# Patient Record
Sex: FEMALE | Race: WHITE | NOT HISPANIC OR LATINO | Employment: OTHER | URBAN - METROPOLITAN AREA
[De-identification: names, ages, dates, MRNs, and addresses within clinical notes are randomized per-mention and may not be internally consistent; named-entity substitution may affect disease eponyms.]

---

## 2017-05-12 ENCOUNTER — RECORDS - HEALTHEAST (OUTPATIENT)
Dept: LAB | Facility: CLINIC | Age: 57
End: 2017-05-12

## 2017-05-12 LAB
CHOLEST SERPL-MCNC: 185 MG/DL
CHOLEST SERPL-MCNC: NORMAL MG/DL
FASTING STATUS PATIENT QL REPORTED: YES
FASTING STATUS PATIENT QL REPORTED: YES
HDLC SERPL-MCNC: 47 MG/DL
HDLC SERPL-MCNC: NORMAL MG/DL
LDLC SERPL CALC-MCNC: 127 MG/DL
LDLC SERPL CALC-MCNC: NORMAL MG/DL
TRIGL SERPL-MCNC: 55 MG/DL
TRIGL SERPL-MCNC: NORMAL MG/DL

## 2017-05-19 ENCOUNTER — RECORDS - HEALTHEAST (OUTPATIENT)
Dept: ADMINISTRATIVE | Facility: OTHER | Age: 57
End: 2017-05-19

## 2018-06-26 ENCOUNTER — RECORDS - HEALTHEAST (OUTPATIENT)
Dept: LAB | Facility: CLINIC | Age: 58
End: 2018-06-26

## 2018-06-26 LAB
ALBUMIN SERPL-MCNC: 4.1 G/DL (ref 3.5–5)
ALP SERPL-CCNC: 71 U/L (ref 45–120)
ALT SERPL W P-5'-P-CCNC: 19 U/L (ref 0–45)
ANION GAP SERPL CALCULATED.3IONS-SCNC: 12 MMOL/L (ref 5–18)
AST SERPL W P-5'-P-CCNC: 22 U/L (ref 0–40)
BILIRUB SERPL-MCNC: 0.5 MG/DL (ref 0–1)
BUN SERPL-MCNC: 10 MG/DL (ref 8–22)
CALCIUM SERPL-MCNC: 9.8 MG/DL (ref 8.5–10.5)
CHLORIDE BLD-SCNC: 104 MMOL/L (ref 98–107)
CO2 SERPL-SCNC: 25 MMOL/L (ref 22–31)
CREAT SERPL-MCNC: 0.78 MG/DL (ref 0.6–1.1)
GFR SERPL CREATININE-BSD FRML MDRD: >60 ML/MIN/1.73M2
GLUCOSE BLD-MCNC: 91 MG/DL (ref 70–125)
POTASSIUM BLD-SCNC: 4.1 MMOL/L (ref 3.5–5)
PROT SERPL-MCNC: 6.8 G/DL (ref 6–8)
RHEUMATOID FACT SERPL-ACNC: <15 IU/ML (ref 0–30)
SODIUM SERPL-SCNC: 141 MMOL/L (ref 136–145)
VIT B12 SERPL-MCNC: 350 PG/ML (ref 213–816)

## 2019-02-28 LAB
HPV SOURCE: NORMAL
HUMAN PAPILLOMA VIRUS 16 DNA: NEGATIVE
HUMAN PAPILLOMA VIRUS 18 DNA: NEGATIVE
HUMAN PAPILLOMA VIRUS FINAL DIAGNOSIS: NORMAL
HUMAN PAPILLOMA VIRUS OTHER HR: NEGATIVE
SPECIMEN DESCRIPTION: NORMAL

## 2019-03-05 ENCOUNTER — RECORDS - HEALTHEAST (OUTPATIENT)
Dept: ADMINISTRATIVE | Facility: OTHER | Age: 59
End: 2019-03-05

## 2020-06-29 ENCOUNTER — RECORDS - HEALTHEAST (OUTPATIENT)
Dept: LAB | Facility: CLINIC | Age: 60
End: 2020-06-29

## 2020-06-30 LAB — COVID-19 ANTIBODY IGG: NEGATIVE

## 2021-03-04 ENCOUNTER — RECORDS - HEALTHEAST (OUTPATIENT)
Dept: LAB | Facility: CLINIC | Age: 61
End: 2021-03-04

## 2021-03-04 LAB
ALBUMIN SERPL-MCNC: 4.1 G/DL (ref 3.5–5)
ALP SERPL-CCNC: 76 U/L (ref 45–120)
ALT SERPL W P-5'-P-CCNC: 18 U/L (ref 0–45)
ANION GAP SERPL CALCULATED.3IONS-SCNC: 10 MMOL/L (ref 5–18)
AST SERPL W P-5'-P-CCNC: 21 U/L (ref 0–40)
BILIRUB SERPL-MCNC: 0.2 MG/DL (ref 0–1)
BUN SERPL-MCNC: 14 MG/DL (ref 8–22)
CALCIUM SERPL-MCNC: 9.2 MG/DL (ref 8.5–10.5)
CHLORIDE BLD-SCNC: 108 MMOL/L (ref 98–107)
CO2 SERPL-SCNC: 24 MMOL/L (ref 22–31)
CREAT SERPL-MCNC: 0.71 MG/DL (ref 0.6–1.1)
ERYTHROCYTE [SEDIMENTATION RATE] IN BLOOD BY WESTERGREN METHOD: 8 MM/HR (ref 0–20)
GFR SERPL CREATININE-BSD FRML MDRD: >60 ML/MIN/1.73M2
GLUCOSE BLD-MCNC: 100 MG/DL (ref 70–125)
POTASSIUM BLD-SCNC: 4.4 MMOL/L (ref 3.5–5)
PROT SERPL-MCNC: 6.7 G/DL (ref 6–8)
SODIUM SERPL-SCNC: 142 MMOL/L (ref 136–145)

## 2021-03-05 ENCOUNTER — RECORDS - HEALTHEAST (OUTPATIENT)
Dept: LAB | Facility: CLINIC | Age: 61
End: 2021-03-05

## 2021-03-05 LAB — COVID-19 ANTIBODY IGG: NEGATIVE

## 2021-03-06 LAB
FERRITIN SERPL-MCNC: 77 NG/ML (ref 10–130)
IRON SATN MFR SERPL: 26 % (ref 20–50)
IRON SERPL-MCNC: 79 UG/DL (ref 42–175)
TIBC SERPL-MCNC: 309 UG/DL (ref 313–563)
TRANSFERRIN SERPL-MCNC: 247 MG/DL (ref 212–360)

## 2022-07-06 ENCOUNTER — LAB REQUISITION (OUTPATIENT)
Dept: LAB | Facility: CLINIC | Age: 62
End: 2022-07-06
Payer: COMMERCIAL

## 2022-07-06 DIAGNOSIS — Z13.220 ENCOUNTER FOR SCREENING FOR LIPOID DISORDERS: ICD-10-CM

## 2022-07-06 LAB
CHOLEST SERPL-MCNC: 222 MG/DL
HDLC SERPL-MCNC: 64 MG/DL
LDLC SERPL CALC-MCNC: 140 MG/DL
NONHDLC SERPL-MCNC: 158 MG/DL
TRIGL SERPL-MCNC: 89 MG/DL

## 2022-07-06 PROCEDURE — 80061 LIPID PANEL: CPT | Mod: ORL | Performed by: FAMILY MEDICINE

## 2023-03-07 ENCOUNTER — LAB REQUISITION (OUTPATIENT)
Dept: LAB | Facility: CLINIC | Age: 63
End: 2023-03-07
Payer: COMMERCIAL

## 2023-03-07 DIAGNOSIS — Z76.89 PERSONS ENCOUNTERING HEALTH SERVICES IN OTHER SPECIFIED CIRCUMSTANCES: ICD-10-CM

## 2023-03-07 LAB
ALBUMIN SERPL BCG-MCNC: 4.3 G/DL (ref 3.5–5.2)
ALP SERPL-CCNC: 100 U/L (ref 35–104)
ALT SERPL W P-5'-P-CCNC: 117 U/L (ref 10–35)
ANION GAP SERPL CALCULATED.3IONS-SCNC: 13 MMOL/L (ref 7–15)
AST SERPL W P-5'-P-CCNC: 131 U/L (ref 10–35)
BILIRUB SERPL-MCNC: 0.2 MG/DL
BUN SERPL-MCNC: 11.8 MG/DL (ref 8–23)
CALCIUM SERPL-MCNC: 9.5 MG/DL (ref 8.8–10.2)
CHLORIDE SERPL-SCNC: 101 MMOL/L (ref 98–107)
CREAT SERPL-MCNC: 0.79 MG/DL (ref 0.51–0.95)
DEPRECATED HCO3 PLAS-SCNC: 24 MMOL/L (ref 22–29)
GFR SERPL CREATININE-BSD FRML MDRD: 83 ML/MIN/1.73M2
GLUCOSE SERPL-MCNC: 172 MG/DL (ref 70–99)
POTASSIUM SERPL-SCNC: 4.7 MMOL/L (ref 3.4–5.3)
PROT SERPL-MCNC: 6.6 G/DL (ref 6.4–8.3)
SODIUM SERPL-SCNC: 138 MMOL/L (ref 136–145)

## 2023-03-07 PROCEDURE — 80053 COMPREHEN METABOLIC PANEL: CPT | Mod: ORL | Performed by: FAMILY MEDICINE

## 2023-05-10 ENCOUNTER — LAB REQUISITION (OUTPATIENT)
Dept: LAB | Facility: CLINIC | Age: 63
End: 2023-05-10
Payer: COMMERCIAL

## 2023-05-10 DIAGNOSIS — Z13.220 ENCOUNTER FOR SCREENING FOR LIPOID DISORDERS: ICD-10-CM

## 2023-05-10 DIAGNOSIS — Z86.16 PERSONAL HISTORY OF COVID-19: ICD-10-CM

## 2023-05-10 DIAGNOSIS — Z00.00 ENCOUNTER FOR GENERAL ADULT MEDICAL EXAMINATION WITHOUT ABNORMAL FINDINGS: ICD-10-CM

## 2023-05-10 LAB
ALBUMIN SERPL BCG-MCNC: 4.5 G/DL (ref 3.5–5.2)
ALP SERPL-CCNC: 68 U/L (ref 35–104)
ALT SERPL W P-5'-P-CCNC: 32 U/L (ref 10–35)
ANION GAP SERPL CALCULATED.3IONS-SCNC: 14 MMOL/L (ref 7–15)
AST SERPL W P-5'-P-CCNC: 28 U/L (ref 10–35)
BILIRUB SERPL-MCNC: 0.4 MG/DL
BUN SERPL-MCNC: 11.1 MG/DL (ref 8–23)
CALCIUM SERPL-MCNC: 9.6 MG/DL (ref 8.8–10.2)
CHLORIDE SERPL-SCNC: 102 MMOL/L (ref 98–107)
CHOLEST SERPL-MCNC: 233 MG/DL
CREAT SERPL-MCNC: 0.76 MG/DL (ref 0.51–0.95)
DEPRECATED HCO3 PLAS-SCNC: 25 MMOL/L (ref 22–29)
GFR SERPL CREATININE-BSD FRML MDRD: 87 ML/MIN/1.73M2
GLUCOSE SERPL-MCNC: 86 MG/DL (ref 70–99)
HDLC SERPL-MCNC: 65 MG/DL
LDLC SERPL CALC-MCNC: 149 MG/DL
NONHDLC SERPL-MCNC: 168 MG/DL
POTASSIUM SERPL-SCNC: 4.7 MMOL/L (ref 3.4–5.3)
PROT SERPL-MCNC: 6.9 G/DL (ref 6.4–8.3)
SODIUM SERPL-SCNC: 141 MMOL/L (ref 136–145)
TRIGL SERPL-MCNC: 95 MG/DL

## 2023-05-10 PROCEDURE — 87624 HPV HI-RISK TYP POOLED RSLT: CPT | Mod: ORL | Performed by: FAMILY MEDICINE

## 2023-05-10 PROCEDURE — 80061 LIPID PANEL: CPT | Mod: ORL | Performed by: FAMILY MEDICINE

## 2023-05-10 PROCEDURE — 80053 COMPREHEN METABOLIC PANEL: CPT | Mod: ORL | Performed by: FAMILY MEDICINE

## 2023-05-10 PROCEDURE — G0145 SCR C/V CYTO,THINLAYER,RESCR: HCPCS | Mod: ORL | Performed by: FAMILY MEDICINE

## 2023-05-12 LAB
BKR LAB AP GYN ADEQUACY: NORMAL
BKR LAB AP GYN INTERPRETATION: NORMAL
BKR LAB AP HPV REFLEX: NORMAL
BKR LAB AP PREVIOUS ABNORMAL: NORMAL
PATH REPORT.COMMENTS IMP SPEC: NORMAL
PATH REPORT.COMMENTS IMP SPEC: NORMAL
PATH REPORT.RELEVANT HX SPEC: NORMAL

## 2023-05-15 LAB
HUMAN PAPILLOMA VIRUS 16 DNA: NEGATIVE
HUMAN PAPILLOMA VIRUS 18 DNA: NEGATIVE
HUMAN PAPILLOMA VIRUS FINAL DIAGNOSIS: NORMAL
HUMAN PAPILLOMA VIRUS OTHER HR: NEGATIVE

## 2024-03-12 ENCOUNTER — OFFICE VISIT (OUTPATIENT)
Dept: URGENT CARE | Facility: URGENT CARE | Age: 64
End: 2024-03-12
Payer: COMMERCIAL

## 2024-03-12 VITALS
HEART RATE: 86 BPM | RESPIRATION RATE: 18 BRPM | SYSTOLIC BLOOD PRESSURE: 135 MMHG | WEIGHT: 122.7 LBS | DIASTOLIC BLOOD PRESSURE: 87 MMHG | TEMPERATURE: 99.8 F | OXYGEN SATURATION: 98 %

## 2024-03-12 DIAGNOSIS — J01.00 ACUTE NON-RECURRENT MAXILLARY SINUSITIS: Primary | ICD-10-CM

## 2024-03-12 LAB
FLUAV AG SPEC QL IA: NEGATIVE
FLUBV AG SPEC QL IA: NEGATIVE

## 2024-03-12 PROCEDURE — 87804 INFLUENZA ASSAY W/OPTIC: CPT | Performed by: PHYSICIAN ASSISTANT

## 2024-03-12 PROCEDURE — 99203 OFFICE O/P NEW LOW 30 MIN: CPT | Performed by: PHYSICIAN ASSISTANT

## 2024-03-12 RX ORDER — ERGOCALCIFEROL 1.25 MG/1
5000 CAPSULE ORAL DAILY
COMMUNITY

## 2024-03-13 NOTE — PROGRESS NOTES
URGENT CARE VISIT:    SUBJECTIVE:   Verónica Horner is a 64 year old female presenting with a chief complaint of fever, stuffy nose, cough - productive, facial pain/pressure, and headache.  Onset was 1 week(s) ago.   She denies the following symptoms: shortness of breath  Course of illness is same.  Headache resolved once she got up and moving in the am.  Treatment measures tried include Tylenol/Ibuprofen with some relief of symptoms.  Predisposing factors include ill contact: Family member .    PMH: History reviewed. No pertinent past medical history.  Allergies: Clindamycin   Medications:   Current Outpatient Medications   Medication Sig Dispense Refill    amoxicillin-clavulanate (AUGMENTIN) 875-125 MG tablet Take 1 tablet by mouth 2 times daily for 7 days 14 tablet 0    ergocalciferol (ERGOCALCIFEROL) 1.25 MG (56026 UT) capsule Take 5,000 Units by mouth daily       Social History:   Social History     Tobacco Use    Smoking status: Not on file    Smokeless tobacco: Not on file   Substance Use Topics    Alcohol use: Not on file       ROS:  Review of systems negative except as stated above.    OBJECTIVE:  /87 (BP Location: Right arm, Patient Position: Sitting, Cuff Size: Adult Regular)   Pulse 86   Temp 99.8  F (37.7  C) (Tympanic)   Resp 18   Wt 55.7 kg (122 lb 11.2 oz)   SpO2 98%   GENERAL APPEARANCE: healthy, alert and no distress  EYES: EOMI,  PERRL, conjunctiva clear  HENT: ear canals and TM's normal.  Nose and mouth without ulcers, erythema or lesions  NECK: supple, nontender, no lymphadenopathy  RESP: lungs clear to auscultation - no rales, rhonchi or wheezes  CV: regular rates and rhythm, normal S1 S2, no murmur noted  SKIN: no suspicious lesions or rashes    Labs:    Results for orders placed or performed in visit on 03/12/24   Influenza A & B Antigen - Clinic Collect     Status: Normal    Specimen: Nose; Swab   Result Value Ref Range    Influenza A antigen Negative Negative    Influenza B  antigen Negative Negative    Narrative    Test results must be correlated with clinical data. If necessary, results should be confirmed by a molecular assay or viral culture.       ASSESSMENT:    ICD-10-CM    1. Acute non-recurrent maxillary sinusitis  J01.00 Influenza A & B Antigen - Clinic Collect     amoxicillin-clavulanate (AUGMENTIN) 875-125 MG tablet          PLAN:  Patient Instructions   Patient was educated on the natural course of condition.  Take medications as prescribed if symptoms do not improve with the discussed measures in 3 days. Side effects may include stomachache or diarrhea. Conservative measures discussed including increased fluids, nasal saline irrigation (neti pot), Afrin nasal spray for 3 days, warm steamy shower, expectorants (Mucinex), and analgesics (Tylenol and/or Ibuprofen). See your primary care provider if symptoms worsen or do not improve in 7 days. Seek emergency care if you develop fever over 103 or shortness of breath.    Patient verbalized understanding and is agreeable to plan. The patient was discharged ambulatory and in stable condition.    Rosalia Jacobs PA-C ....................  3/12/2024   7:21 PM

## 2024-03-13 NOTE — PATIENT INSTRUCTIONS
Patient was educated on the natural course of condition.  Take medications as prescribed. Side effects may include stomachache or diarrhea. Conservative measures discussed including increased fluids, nasal saline irrigation (neti pot), Afrin nasal spray for 3 days, warm steamy shower, expectorants (Mucinex), and analgesics (Tylenol and/or Ibuprofen). See your primary care provider if symptoms worsen or do not improve in 7 days. Seek emergency care if you develop fever over 103 or shortness of breath.

## 2024-05-12 ENCOUNTER — HEALTH MAINTENANCE LETTER (OUTPATIENT)
Age: 64
End: 2024-05-12

## 2024-05-20 ENCOUNTER — LAB REQUISITION (OUTPATIENT)
Dept: LAB | Facility: CLINIC | Age: 64
End: 2024-05-20
Payer: COMMERCIAL

## 2024-05-20 DIAGNOSIS — Z13.220 ENCOUNTER FOR SCREENING FOR LIPOID DISORDERS: ICD-10-CM

## 2024-05-20 LAB
CHOLEST SERPL-MCNC: 209 MG/DL
FASTING STATUS PATIENT QL REPORTED: ABNORMAL
HDLC SERPL-MCNC: 56 MG/DL
LDLC SERPL CALC-MCNC: 138 MG/DL
NONHDLC SERPL-MCNC: 153 MG/DL
TRIGL SERPL-MCNC: 77 MG/DL

## 2024-05-20 PROCEDURE — 80061 LIPID PANEL: CPT | Mod: ORL | Performed by: FAMILY MEDICINE

## 2024-12-12 ENCOUNTER — TRANSCRIBE ORDERS (OUTPATIENT)
Dept: OTHER | Age: 64
End: 2024-12-12

## 2024-12-12 DIAGNOSIS — M54.2 CERVICALGIA: Primary | ICD-10-CM

## 2024-12-27 PROBLEM — M54.2 NECK PAIN: Status: ACTIVE | Noted: 2024-12-27

## 2024-12-27 PROBLEM — M25.512 CHRONIC LEFT SHOULDER PAIN: Status: ACTIVE | Noted: 2024-12-27

## 2024-12-27 PROBLEM — G89.29 CHRONIC LEFT SHOULDER PAIN: Status: ACTIVE | Noted: 2024-12-27

## 2025-02-04 ENCOUNTER — THERAPY VISIT (OUTPATIENT)
Dept: PHYSICAL THERAPY | Facility: CLINIC | Age: 65
End: 2025-02-04
Payer: COMMERCIAL

## 2025-02-04 DIAGNOSIS — M25.512 CHRONIC LEFT SHOULDER PAIN: Primary | ICD-10-CM

## 2025-02-04 DIAGNOSIS — G89.29 CHRONIC LEFT SHOULDER PAIN: Primary | ICD-10-CM

## 2025-02-04 DIAGNOSIS — M54.2 NECK PAIN: ICD-10-CM

## 2025-02-04 PROCEDURE — 97110 THERAPEUTIC EXERCISES: CPT | Mod: GP

## 2025-02-04 PROCEDURE — 97530 THERAPEUTIC ACTIVITIES: CPT | Mod: GP

## 2025-04-01 ENCOUNTER — THERAPY VISIT (OUTPATIENT)
Dept: PHYSICAL THERAPY | Facility: CLINIC | Age: 65
End: 2025-04-01
Payer: COMMERCIAL

## 2025-04-01 DIAGNOSIS — M25.512 CHRONIC LEFT SHOULDER PAIN: Primary | ICD-10-CM

## 2025-04-01 DIAGNOSIS — G89.29 CHRONIC LEFT SHOULDER PAIN: Primary | ICD-10-CM

## 2025-04-01 DIAGNOSIS — M54.2 NECK PAIN: ICD-10-CM

## 2025-04-01 PROCEDURE — 97110 THERAPEUTIC EXERCISES: CPT | Mod: GP

## 2025-04-01 PROCEDURE — 97164 PT RE-EVAL EST PLAN CARE: CPT | Mod: GP

## 2025-04-01 NOTE — PROGRESS NOTES
[facility]                                                                                   OUTPATIENT PHYSICAL THERAPY    PLAN OF TREATMENT FOR OUTPATIENT REHABILITATION   Patient's Last Name, First Name, Verónica Caraballo YOB: 1960   Provider's Name   [facility]   Medical Record No.  [id number]     Onset Date: 12/12/24 (MD order date)  Start of Care Date: 12/27/24     Medical Diagnosis:  Cervicalgia      PT Treatment Diagnosis:  Neck pain, L shoulder pain Plan of Treatment  Frequency/Duration: every-other wk/ 3 mo    Certification date from 03/27/25 to 06/26/25         See note for plan of treatment details and functional goals     NIYAH HERNANDEZ, PT                         I CERTIFY THE NEED FOR THESE SERVICES FURNISHED UNDER        THIS PLAN OF TREATMENT AND WHILE UNDER MY CARE     (Physician attestation of this document indicates review and certification of the therapy plan).              Referring Provider:  Piedad Kelley    Initial Assessment  See Epic Evaluation- Start of Care Date: 12/27/24        PLAN  Continue therapy per current plan of care.    Beginning/End Dates of Progress Note Reporting Period:  12/27/24 to 04/01/2025    Referring Provider:  Piedad Kelley     04/01/25 0500   Appointment Info   Signing clinician's name / credentials Niyah Hernandez, PT, DPT, WCS   Total/Authorized Visits E&T   Visits Used 4   Medical Diagnosis Cervicalgia   PT Tx Diagnosis Neck pain, L shoulder pain   Quick Adds Certification   Progress Note/Certification   Start of Care Date 12/27/24   Onset of illness/injury or Date of Surgery 12/12/24  (MD order date)   Therapy Frequency every-other wk   Predicted Duration 3 mo   Certification date from 03/27/25   Certification date to 06/26/25   Progress Note Due Date 06/26/25   Progress Note Completed Date 12/27/24   [facility] Authorization Information   Condition type Chronic (continuous duration  <3 months)   Cause of current episode Repetitive   Nature of treatment Rehabilitative   Functional ability Minimal functional limitations   Documented POC (choose all that apply) Frequency of treatment visits and treatment activities to address deficit areas.;Patient agrees to program participation including home program;Measurable short and long term/discharge treatment goals related to physical and functional deficits.   Briefly describe symptoms L sided neck and shoulder pain   How did the symptoms start gradual onset w/ no IRMA   Last 24H: avg pain/symptom intensity  3/10   Past wk: avg pain/symptom intensity 4/10   Frequency of Symptoms Constantly (% of the time)   Symptom impact on ADLs Moderately   Condition change since eval Better   General health reported by patient Excellent   GOALS   PT Goals 2   PT Goal 1   Goal Identifier Driving   Goal Description Pt will report driving up to 2 hours w/out neck or shoulder pain   Rationale to maximize safety and independence with performance of ADLs and functional tasks;to maximize safety and independence within the community;to maximize safety and independence with transportation;to maximize safety and independence with self cares   Goal Progress no longer any issue   Target Date 03/26/25   Date Met 04/01/25   PT Goal 2   Goal Identifier Lifting   Goal Description Pt will lift 30 lbs from floor to shoulder height w/ no pain for painfree care of grandchildren and participation in hobbies   Rationale to maximize safety and independence with performance of ADLs and functional tasks;to maximize safety and independence within the home;to maximize safety and independence within the community;to maximize safety and independence with self cares   Goal Progress Pain lifting 15+ lbs currently   Target Date 06/26/25   Subjective Report   Subjective Report Returns after a 2 month break in care. Rain reports has been trying out other things (new pillow, etc) but continues  "to have L neck pain. Reports good compliance to HEP, stretches make her feel more sore. Lifting her grandkids (30 lbs) causes L neck pain. Nervous about upcoming gardening season and don't want to make it worse.   Objective Measures   Objective Measures Objective Measure 1;Objective Measure 2;Objective Measure 3   Objective Measure 1   Objective Measure Pain   Details Located in L anterolateral neck and upper trap. Scalenes are tight and TTP on L   Objective Measure 2   Objective Measure Neck AROM WNL   Details Flexion WNL. Rotation WNL but feels tight BRIJESH. Extension min loss and painful. SB mod loss R, min loss L tight.   Objective Measure 3   Objective Measure Shoulder testing   Details Neer and HK negative BRIJESH . Strength 5/5 painfree BRIJESH. Slightly limited UR on L w/ shoulder elevation. Middle and low trap 4/5 BRIJESH painfree   Treatment Interventions (PT)   Interventions Therapeutic Procedure/Exercise;Therapeutic Activity   Therapeutic Procedure/Exercise   Therapeutic Procedures: strength, endurance, ROM, flexibility minutes (38484) 24   Ther Proc 1 Time to answer pt questions about what ex is safe/what to avoid to prevent reinjury/worsening pain. Educated in avoiding ex that cause cervical flexion for now (crunches and situps) and emphasis on keeping C spine neutral during workouts (don't look down).   PTRx Ther Proc 1 Spinal extensions   PTRx Ther Proc 1 - Details Verbal review   PTRx Ther Proc 2 Trunk Rotation Stretch   PTRx Ther Proc 2 - Details x5 each way w/ cues for positioning during   PTRx Ther Proc 3 All 4s Cat Cow   PTRx Ther Proc 3 - Details Verbal review   PTRx Ther Proc 4 Cervical Isometric Rotation   PTRx Ther Proc 4 - Details 5\"x5 L and R   PTRx Ther Proc 5 Cervical Isometric Side Bending   PTRx Ther Proc 5 - Details 5\"x5 L and R   PTRx Ther Proc 6 Cervical Isometric Flexion   PTRx Ther Proc 6 - Details 5\"x5   PTRx Ther Proc 7 Cervical Isometric Extension   PTRx Ther Proc 7 - Details 5\"x5   PTRx Ther " Proc 8 Serratus Slides on Wall   PTRx Ther Proc 8 - Details Verbal review   PTRx Ther Proc 9 Shoulder External Rotation Sidelying   PTRx Ther Proc 9 - Details Reviewed with 3 lbs x15 bilaterally. Progressed today as this ex felt easy   PTRx Ther Proc 10 Shoulder Theraband External Rotation   PTRx Ther Proc 10 - Details Green TB x15 w/ cues for positioning and setup during   PTRx Ther Proc 11 Shoulder Theraband Internal Rotation   PTRx Ther Proc 11 - Details Green TB x15 w/ cues for positioning and setup during   PTRx Ther Proc 12 Bent Over Rows   PTRx Ther Proc 12 - Details Verbal review   PTRx Ther Proc 13 Cervical Retraction   PTRx Ther Proc 13 - Details Rev x10 seated   PTRx Ther Proc 14 Cervical Extension Supported   PTRx Ther Proc 14 - Details Rev x5 seated   Patient Response/Progress no adverse effects, dec pain post   Therapeutic Activity   Therapeutic Activities Ther Act 2   PTRx Ther Act 1 Foam Roller Shoulder Flexion/Horizontal Abduction   PTRx Ther Act 1 - Details Verbal review   PTRx Ther Act 2 Body Mechanics - Full Squat   PTRx Ther Act 2 - Details Verbal review on body mechanics w/ lifting her grandkids and when gardening   Manual Therapy   Manual Therapy Manual Therapy 2   Eval/Assessments   Assessments PT Re-Eval   PT Eval, Re-eval Minutes (17660) 16   Education   Learner/Method Patient;Listening;Demonstration;Pictures/Video;No Barriers to Learning   Education Comments pt will schedule one appt with Mary Ellen García for further assessment and potential TMJ involvement contributing to her neck sx.   Plan   Home program online access   Updates to plan of care reeval   Plan for next session See Mary Ellen for TMJ/neck assessment   Total Session Time   Timed Code Treatment Minutes 24   Total Treatment Time (sum of timed and untimed services) 40

## 2025-04-28 ENCOUNTER — THERAPY VISIT (OUTPATIENT)
Dept: PHYSICAL THERAPY | Facility: CLINIC | Age: 65
End: 2025-04-28
Payer: COMMERCIAL

## 2025-04-28 DIAGNOSIS — G89.29 CHRONIC LEFT SHOULDER PAIN: Primary | ICD-10-CM

## 2025-04-28 DIAGNOSIS — M25.512 CHRONIC LEFT SHOULDER PAIN: Primary | ICD-10-CM

## 2025-04-28 DIAGNOSIS — M54.2 NECK PAIN: ICD-10-CM

## 2025-04-28 PROCEDURE — 97112 NEUROMUSCULAR REEDUCATION: CPT | Mod: GP | Performed by: PHYSICAL THERAPIST

## 2025-04-28 PROCEDURE — 97110 THERAPEUTIC EXERCISES: CPT | Mod: GP | Performed by: PHYSICAL THERAPIST

## 2025-04-28 PROCEDURE — 97140 MANUAL THERAPY 1/> REGIONS: CPT | Mod: GP | Performed by: PHYSICAL THERAPIST

## 2025-05-15 ENCOUNTER — LAB REQUISITION (OUTPATIENT)
Dept: LAB | Facility: CLINIC | Age: 65
End: 2025-05-15
Payer: COMMERCIAL

## 2025-05-15 DIAGNOSIS — M85.89 OTHER SPECIFIED DISORDERS OF BONE DENSITY AND STRUCTURE, MULTIPLE SITES: ICD-10-CM

## 2025-05-15 DIAGNOSIS — Z00.00 ENCOUNTER FOR GENERAL ADULT MEDICAL EXAMINATION WITHOUT ABNORMAL FINDINGS: ICD-10-CM

## 2025-05-15 LAB
CHOLEST SERPL-MCNC: 191 MG/DL
FASTING STATUS PATIENT QL REPORTED: YES
HDLC SERPL-MCNC: 50 MG/DL
LDLC SERPL CALC-MCNC: 128 MG/DL
NONHDLC SERPL-MCNC: 141 MG/DL
TRIGL SERPL-MCNC: 65 MG/DL
VIT D+METAB SERPL-MCNC: 31 NG/ML (ref 20–50)

## 2025-05-15 PROCEDURE — 80061 LIPID PANEL: CPT | Mod: ORL | Performed by: FAMILY MEDICINE

## 2025-05-15 PROCEDURE — 82306 VITAMIN D 25 HYDROXY: CPT | Mod: ORL | Performed by: FAMILY MEDICINE

## 2025-05-18 ENCOUNTER — HEALTH MAINTENANCE LETTER (OUTPATIENT)
Age: 65
End: 2025-05-18

## 2025-05-22 ENCOUNTER — THERAPY VISIT (OUTPATIENT)
Dept: PHYSICAL THERAPY | Facility: CLINIC | Age: 65
End: 2025-05-22
Payer: COMMERCIAL

## 2025-05-22 DIAGNOSIS — M25.512 CHRONIC LEFT SHOULDER PAIN: Primary | ICD-10-CM

## 2025-05-22 DIAGNOSIS — G89.29 CHRONIC LEFT SHOULDER PAIN: Primary | ICD-10-CM

## 2025-05-22 DIAGNOSIS — M54.2 NECK PAIN: ICD-10-CM

## 2025-06-10 ENCOUNTER — THERAPY VISIT (OUTPATIENT)
Dept: PHYSICAL THERAPY | Facility: CLINIC | Age: 65
End: 2025-06-10
Payer: COMMERCIAL

## 2025-06-10 DIAGNOSIS — M54.2 NECK PAIN: ICD-10-CM

## 2025-06-10 DIAGNOSIS — M25.512 CHRONIC LEFT SHOULDER PAIN: Primary | ICD-10-CM

## 2025-06-10 DIAGNOSIS — G89.29 CHRONIC LEFT SHOULDER PAIN: Primary | ICD-10-CM

## 2025-06-10 PROCEDURE — 97140 MANUAL THERAPY 1/> REGIONS: CPT | Mod: GP | Performed by: PHYSICAL THERAPIST

## 2025-06-10 PROCEDURE — 97110 THERAPEUTIC EXERCISES: CPT | Mod: GP | Performed by: PHYSICAL THERAPIST

## 2025-07-03 ENCOUNTER — THERAPY VISIT (OUTPATIENT)
Dept: PHYSICAL THERAPY | Facility: CLINIC | Age: 65
End: 2025-07-03
Payer: COMMERCIAL

## 2025-07-03 DIAGNOSIS — M25.512 CHRONIC LEFT SHOULDER PAIN: Primary | ICD-10-CM

## 2025-07-03 DIAGNOSIS — G89.29 CHRONIC LEFT SHOULDER PAIN: Primary | ICD-10-CM

## 2025-07-03 DIAGNOSIS — M54.2 NECK PAIN: ICD-10-CM

## 2025-07-03 NOTE — PROGRESS NOTES
[facility]                                                                                   OUTPATIENT PHYSICAL THERAPY    PLAN OF TREATMENT FOR OUTPATIENT REHABILITATION   Patient's Last Name, First Name, Verónica Caraballo YOB: 1960   Provider's Name   [facility]   Medical Record No.  [id number]     Onset Date: 12/12/24 (MD order date)  Start of Care Date: 12/27/24     Medical Diagnosis:  Cervicalgia      PT Treatment Diagnosis:  Neck pain, L shoulder pain Plan of Treatment  Frequency/Duration: every other week/ 3 visits    Certification date from 06/27/25 to 08/07/25         See note for plan of treatment details and functional goals     Mary Ellen García PT                         I CERTIFY THE NEED FOR THESE SERVICES FURNISHED UNDER        THIS PLAN OF TREATMENT AND WHILE UNDER MY CARE .             Physician Signature               Date    X_____________________________________________________                  Referring Provider:  Piedad Kelley    Initial Assessment  See Epic Evaluation- Start of Care Date: 12/27/24            PLAN  Continue therapy per current plan of care.    Beginning/End Dates of Progress Note Reporting Period:  04/01/25 to 07/03/2025    Referring Provider:  Piedad Kelley     07/03/25 0500   Appointment Info   Signing clinician's name / credentials Mary Ellen García DPT   Total/Authorized Visits E&T   Visits Used 8   Medical Diagnosis Cervicalgia   PT Tx Diagnosis Neck pain, L shoulder pain   Progress Note/Certification   Start of Care Date 12/27/24   Onset of illness/injury or Date of Surgery 12/12/24  (MD order date)   Therapy Frequency every other week   Predicted Duration 3 visits   Certification date from 06/27/25   Certification date to 08/07/25   Progress Note Due Date 08/07/25   Progress Note Completed Date 04/01/25   [facility] Authorization Information   Condition type Chronic (continuous  duration >3 months)   Cause of current episode Repetitive   Nature of treatment Rehabilitative   Functional ability Minimal functional limitations   Documented POC (choose all that apply) Frequency of treatment visits and treatment activities to address deficit areas.;Patient agrees to program participation including home program;Measurable short and long term/discharge treatment goals related to physical and functional deficits.   Briefly describe symptoms L sided neck and shoulder pain   How did the symptoms start gradual onset w/ no IRMA   Last 24H: avg pain/symptom intensity  2/10   Past wk: avg pain/symptom intensity 2/10   Frequency of Symptoms Occasionally (26-50% of the time)   Symptom impact on ADLs Moderately   Condition change since eval Much better   General health reported by patient Excellent   GOALS   PT Goals 2   PT Goal 1   Goal Identifier Driving   Goal Description Pt will report driving up to 2 hours w/out neck or shoulder pain   Rationale to maximize safety and independence with performance of ADLs and functional tasks;to maximize safety and independence within the community;to maximize safety and independence with transportation;to maximize safety and independence with self cares   Goal Progress no longer any issue   Target Date 03/26/25   Date Met 04/01/25   PT Goal 2   Goal Identifier Lifting   Goal Description Pt will lift 30 lbs from floor to shoulder height w/ no pain for painfree care of grandchildren and participation in hobbies   Rationale to maximize safety and independence with performance of ADLs and functional tasks;to maximize safety and independence within the home;to maximize safety and independence within the community;to maximize safety and independence with self cares   Goal Progress 15# is getting easier, continues   Target Date 08/07/25   Subjective Report   Subjective Report Rain reports she has been consistent with her HEP - especially the strengthening exercises.  She has  been focusing more on strength training at the gym and gardening.  She does still get some pain when gardening and overhead lifting with small dumbbells.  Neck is mildly sore today rated 2-3/10 on the upper left side.   Objective Measures   Objective Measures Objective Measure 1;Objective Measure 2;Objective Measure 3   Objective Measure 2   Objective Measure Neck AROM WNL   Details Flexion WNL. Rotation R 55-68, L 54-64 but feels tight BRIJESH suboccipitals.   Extension min loss and painful. SB mod loss L with pain, mod loss L tight.   Objective Measure 3   Objective Measure lingrens   Details Again positive on L (negative after 1st rib mob)   Treatment Interventions (PT)   Interventions Therapeutic Procedure/Exercise;Therapeutic Activity;Neuromuscular Re-education   Therapeutic Procedure/Exercise   PTRx Ther Proc 8 Cervical Retraction   PTRx Ther Proc 8 - Details vr   PTRx Ther Proc 11 Neck Strengthening Side Bending   PTRx Ther Proc 11 - Details vr   PTRx Ther Proc 12 Bent Over Rows   PTRx Ther Proc 12 - Details VR. Cont HEP   PTRx Ther Proc 13 Bird dog   PTRx Ther Proc 13 - Details VR. Cont HEP   PTRx Ther Proc 14 Cervical Extension Supported   PTRx Ther Proc 14 - Details VR. Cont HEP   Skilled Intervention Assessment of strength and functional deficits to determine exercise prescription; tactile and verbal cuing to assist with proper performance; education in loading principles and expected response   Patient Response/Progress no adverse effects, dec pain post   Therapeutic Activity   Therapeutic Activities Ther Act 2   PTRx Ther Act 1 Foam Roller Shoulder Flexion/Horizontal Abduction   PTRx Ther Act 1 - Details Verbal review   PTRx Ther Act 2 Body Mechanics - Full Squat   PTRx Ther Act 2 - Details Verbal review on body mechanics w/ lifting her grandkids and when gardening   Neuromuscular Re-education   Neuromuscular re-ed of mvmt, balance, coord, kinesthetic sense, posture, proprioception minutes (19742) 14  "  Neuro Re-ed 1 Dumbell flies, overhead press and lateral arm raise   Neuro Re-ed 1 - Details 5# x 12 each B, cues for scap positions   PTRx Neuro Re-ed 1 Quadruped DNF   PTRx Neuro Re-ed 1 - Details x 10 vc for form and indications for exercises   PTRx Neuro Re-ed 2 Upper Cervical Strengthening Extension   PTRx Neuro Re-ed 2 - Details trial x 10 with 5\" hold, VC to engage scapulae down away from ears   Skilled Intervention see exercise   Patient Response/Progress tolerates well - good form but needs lots of concentration   Manual Therapy   Manual Therapy: Mobilization, MFR, MLD, friction massage minutes (11103) 25   Manual Therapy Manual Therapy 3   Manual Therapy 1 - Details 1st rib mob on L MET anterior and posterior   Manual Therapy 2 ischemic compression and MFR of suboccipitals, scm, scalenes   Manual Therapy 2 - Details Sideglides C3-5on  R grade IV x 3 mins   Manual Therapy 3 OA mobs on L- recreates cheif complaint but tolerates well grade III-IV   Skilled Intervention applicaiton of technique, grading of force   Patient Response/Progress neck pain nearly resolves, improved ROM   Education   Learner/Method Patient;Listening;Demonstration;Pictures/Video;No Barriers to Learning   Plan   Home program online access   Updates to plan of care reeval   Total Session Time   Timed Code Treatment Minutes 39   Total Treatment Time (sum of timed and untimed services) 39     "

## 2025-07-22 ENCOUNTER — THERAPY VISIT (OUTPATIENT)
Dept: PHYSICAL THERAPY | Facility: CLINIC | Age: 65
End: 2025-07-22
Payer: COMMERCIAL

## 2025-07-22 DIAGNOSIS — M25.512 CHRONIC LEFT SHOULDER PAIN: Primary | ICD-10-CM

## 2025-07-22 DIAGNOSIS — M54.2 NECK PAIN: ICD-10-CM

## 2025-07-22 DIAGNOSIS — G89.29 CHRONIC LEFT SHOULDER PAIN: Primary | ICD-10-CM

## 2025-07-22 PROCEDURE — 97110 THERAPEUTIC EXERCISES: CPT | Mod: GP | Performed by: PHYSICAL THERAPIST

## 2025-07-22 NOTE — PROGRESS NOTES
Beginning/End Dates of Progress Note Reporting Period:  06/27/25 to 07/22/2025    Referring Provider:  Piedad Kelley    DISCHARGE  Reason for Discharge: Patient has met all goals.    Equipment Issued: none    Discharge Plan: Patient to continue home program.    Referring Provider:  Piedad Kelley     07/22/25 0500   Appointment Info   Signing clinician's name / credentials Mary Ellen García DPT   Total/Authorized Visits E&T   Visits Used 9   Medical Diagnosis Cervicalgia   PT Tx Diagnosis Neck pain, L shoulder pain   Progress Note/Certification   Start of Care Date 12/27/24   Onset of illness/injury or Date of Surgery 12/12/24  (MD order date)   Therapy Frequency every other week   Predicted Duration 3 visits   Certification date from 06/27/25   Certification date to 08/07/25   Progress Note Due Date 08/07/25   Progress Note Completed Date 04/01/25   [facility] Authorization Information   Condition type Chronic (continuous duration >3 months)   Cause of current episode Repetitive   Nature of treatment Rehabilitative   Documented POC (choose all that apply) Frequency of treatment visits and treatment activities to address deficit areas.;Patient agrees to program participation including home program;Measurable short and long term/discharge treatment goals related to physical and functional deficits.   How did the symptoms start gradual onset w/ no IRMA   General health reported by patient Excellent   GOALS   PT Goals 2   PT Goal 1   Goal Identifier Driving   Goal Description Pt will report driving up to 2 hours w/out neck or shoulder pain   Rationale to maximize safety and independence with performance of ADLs and functional tasks;to maximize safety and independence within the community;to maximize safety and independence with transportation;to maximize safety and independence with self cares   Goal Progress no longer any issue   Target Date 03/26/25   Date Met 04/01/25   PT Goal 2   Goal Identifier Lifting   Goal  "Description Pt will lift 30 lbs from floor to shoulder height w/ no pain for painfree care of grandchildren and participation in hobbies   Rationale to maximize safety and independence with performance of ADLs and functional tasks;to maximize safety and independence within the home;to maximize safety and independence within the community;to maximize safety and independence with self cares   Goal Progress able to lift 40# grandchild with no issues, though was too difficult to walk while holding her   Target Date 08/07/25   Date Met 07/22/25   Subjective Report   Subjective Report Rain reports she can do low reps with weight with overhead exercises.  Exercising and gardening has been better. She has been able to increase amount of time and weights. She has to focus on maintaining good scapular position throughout the whole time.  She was sore after last session's manual work. She is interested in getting help finding the knot to know she can work on it once she graduates from PT. She is feeling very confident in managing ongoing symptoms independently.   Objective Measures   Objective Measures Objective Measure 1;Objective Measure 2;Objective Measure 3   Objective Measure 2   Objective Measure Neck AROM WNL   Details Flexion WNL. Rotation R  68, L  64.   Objective Measure 3   Objective Measure NDI   Details 6% today (down from 24% at initial eval)   Treatment Interventions (PT)   Interventions Therapeutic Procedure/Exercise;Therapeutic Activity;Neuromuscular Re-education   Therapeutic Procedure/Exercise   Therapeutic Procedures: strength, endurance, ROM, flexibility minutes (60417) 24   PTRx Ther Proc 7 Discussed progress in PT, reviewed progress toward goal and with NDI. Discussed transition to maintenance phase of HEP performance.   PTRx Ther Proc 8 Cervical Retraction   PTRx Ther Proc 8 - Details x 10   PTRx Ther Proc 9 scalene stretch 3 x 30\" B   PTRx Ther Proc 11 Neck Strengthening Side Bending   PTRx Ther Proc " 11 - Details vr   PTRx Ther Proc 12 Bent Over Rows   PTRx Ther Proc 12 - Details VR. Cont HEP   PTRx Ther Proc 13 Bird dog   PTRx Ther Proc 13 - Details VR. Cont HEP   PTRx Ther Proc 14 Cervical Extension Supported   PTRx Ther Proc 14 - Details vr   Skilled Intervention Assessment of strength and functional deficits to determine exercise prescription; tactile and verbal cuing to assist with proper performance; education in loading principles and expected response   Patient Response/Progress tolerates well   Therapeutic Activity   Therapeutic Activities Ther Act 2   PTRx Ther Act 1 Foam Roller Shoulder Flexion/Horizontal Abduction   PTRx Ther Act 1 - Details Verbal review   PTRx Ther Act 2 Body Mechanics - Full Squat   PTRx Ther Act 2 - Details Verbal review on body mechanics w/ lifting her grandkids and when gardening   Neuromuscular Re-education   Neuro Re-ed 1 Dumbell flies, overhead press and lateral arm raise   Neuro Re-ed 1 - Details vr   PTRx Neuro Re-ed 1 Quadruped DNF   PTRx Neuro Re-ed 1 - Details vr   PTRx Neuro Re-ed 2 Upper Cervical Strengthening Extension   PTRx Neuro Re-ed 2 - Details vr   Skilled Intervention see exercise   Manual Therapy   Manual Therapy: Mobilization, MFR, MLD, friction massage minutes (03336) 8   Manual Therapy Manual Therapy 3   Manual Therapy 2 ischemic compression and MFR of scalenes and lateral pterygoid.  Coached pt to be able to find trigger points on her own with the use of a mirror to find the correct location behind SCM   Skilled Intervention applicaiton of technique, grading of force   Patient Response/Progress reduced pain following with lighter STM   Education   Learner/Method Patient;Listening;Demonstration;Pictures/Video;No Barriers to Learning   Plan   Home program online access   Plan for next session graduate with home exercise program   Total Session Time   Timed Code Treatment Minutes 32   Total Treatment Time (sum of timed and untimed services) 32